# Patient Record
Sex: MALE | Race: BLACK OR AFRICAN AMERICAN | NOT HISPANIC OR LATINO
[De-identification: names, ages, dates, MRNs, and addresses within clinical notes are randomized per-mention and may not be internally consistent; named-entity substitution may affect disease eponyms.]

---

## 2021-05-13 PROBLEM — Z00.00 ENCOUNTER FOR PREVENTIVE HEALTH EXAMINATION: Status: ACTIVE | Noted: 2021-05-13

## 2021-05-14 ENCOUNTER — APPOINTMENT (OUTPATIENT)
Dept: INTERNAL MEDICINE | Facility: CLINIC | Age: 76
End: 2021-05-14
Payer: MEDICARE

## 2021-05-14 ENCOUNTER — TRANSCRIPTION ENCOUNTER (OUTPATIENT)
Age: 76
End: 2021-05-14

## 2021-05-14 VITALS
HEART RATE: 107 BPM | TEMPERATURE: 97.3 F | SYSTOLIC BLOOD PRESSURE: 120 MMHG | DIASTOLIC BLOOD PRESSURE: 64 MMHG | WEIGHT: 165 LBS | HEIGHT: 71 IN | BODY MASS INDEX: 23.1 KG/M2 | OXYGEN SATURATION: 97 %

## 2021-05-14 DIAGNOSIS — Z78.9 OTHER SPECIFIED HEALTH STATUS: ICD-10-CM

## 2021-05-14 PROCEDURE — 93000 ELECTROCARDIOGRAM COMPLETE: CPT

## 2021-05-14 PROCEDURE — 99204 OFFICE O/P NEW MOD 45 MIN: CPT

## 2021-05-14 PROCEDURE — 36415 COLL VENOUS BLD VENIPUNCTURE: CPT

## 2021-05-14 RX ORDER — NIFEDIPINE 60 MG/1
60 TABLET, EXTENDED RELEASE ORAL
Qty: 90 | Refills: 3 | Status: ACTIVE | COMMUNITY
Start: 2021-05-14

## 2021-05-14 RX ORDER — ATORVASTATIN CALCIUM 20 MG/1
20 TABLET, FILM COATED ORAL
Qty: 90 | Refills: 3 | Status: ACTIVE | COMMUNITY
Start: 2021-05-14

## 2021-05-14 NOTE — HISTORY OF PRESENT ILLNESS
[de-identified] : 74 yo M with hx of prostate CA s/p TURP, HTN. HLD.    severe left hip pain with plan for left THR.\par [facility]  \par \par \par colonoscopy - 3 yeas ago.  \par \par \par 8-10 METs\par

## 2021-05-14 NOTE — PLAN
[FreeTextEntry1] : HTN- controlled on current regimen\par HLD- continue statin\par \par Pre operative evaluation - medically optimized but recommend cardiac clearance as well with abnormal EKG.

## 2021-05-16 LAB
ALBUMIN SERPL ELPH-MCNC: 4.7 G/DL
ALP BLD-CCNC: 79 U/L
ALT SERPL-CCNC: 8 U/L
ANION GAP SERPL CALC-SCNC: 12 MMOL/L
APPEARANCE: ABNORMAL
APTT BLD: 35.4 SEC
AST SERPL-CCNC: 16 U/L
BASOPHILS # BLD AUTO: 0.03 K/UL
BASOPHILS NFR BLD AUTO: 0.5 %
BILIRUB SERPL-MCNC: 0.9 MG/DL
BILIRUBIN URINE: NEGATIVE
BLOOD URINE: NEGATIVE
BUN SERPL-MCNC: 12 MG/DL
CALCIUM SERPL-MCNC: 10.4 MG/DL
CHLORIDE SERPL-SCNC: 105 MMOL/L
CO2 SERPL-SCNC: 26 MMOL/L
COLOR: ABNORMAL
CREAT SERPL-MCNC: 1.18 MG/DL
EOSINOPHIL # BLD AUTO: 0.16 K/UL
EOSINOPHIL NFR BLD AUTO: 2.6 %
GLUCOSE QUALITATIVE U: NEGATIVE
GLUCOSE SERPL-MCNC: 100 MG/DL
HCT VFR BLD CALC: 44.8 %
HGB BLD-MCNC: 14.6 G/DL
IMM GRANULOCYTES NFR BLD AUTO: 0.3 %
INR PPP: 0.99 RATIO
KETONES URINE: NORMAL
LEUKOCYTE ESTERASE URINE: NEGATIVE
LYMPHOCYTES # BLD AUTO: 1.28 K/UL
LYMPHOCYTES NFR BLD AUTO: 20.5 %
MAN DIFF?: NORMAL
MCHC RBC-ENTMCNC: 29.4 PG
MCHC RBC-ENTMCNC: 32.6 GM/DL
MCV RBC AUTO: 90.3 FL
MONOCYTES # BLD AUTO: 0.53 K/UL
MONOCYTES NFR BLD AUTO: 8.5 %
NEUTROPHILS # BLD AUTO: 4.22 K/UL
NEUTROPHILS NFR BLD AUTO: 67.6 %
NITRITE URINE: NEGATIVE
PH URINE: 5.5
PLATELET # BLD AUTO: 206 K/UL
POTASSIUM SERPL-SCNC: 3.9 MMOL/L
PROT SERPL-MCNC: 7.9 G/DL
PROTEIN URINE: ABNORMAL
PT BLD: 11.7 SEC
RBC # BLD: 4.96 M/UL
RBC # FLD: 13.9 %
SODIUM SERPL-SCNC: 143 MMOL/L
SPECIFIC GRAVITY URINE: 1.03
UROBILINOGEN URINE: ABNORMAL
WBC # FLD AUTO: 6.24 K/UL

## 2021-05-25 ENCOUNTER — APPOINTMENT (OUTPATIENT)
Dept: HEART AND VASCULAR | Facility: CLINIC | Age: 76
End: 2021-05-25
Payer: MEDICARE

## 2021-05-25 VITALS
TEMPERATURE: 97 F | HEIGHT: 71 IN | HEART RATE: 88 BPM | SYSTOLIC BLOOD PRESSURE: 130 MMHG | BODY MASS INDEX: 23.1 KG/M2 | WEIGHT: 165 LBS | OXYGEN SATURATION: 97 % | DIASTOLIC BLOOD PRESSURE: 80 MMHG

## 2021-05-25 DIAGNOSIS — I10 ESSENTIAL (PRIMARY) HYPERTENSION: ICD-10-CM

## 2021-05-25 DIAGNOSIS — Z01.818 ENCOUNTER FOR OTHER PREPROCEDURAL EXAMINATION: ICD-10-CM

## 2021-05-25 DIAGNOSIS — E78.5 HYPERLIPIDEMIA, UNSPECIFIED: ICD-10-CM

## 2021-05-25 DIAGNOSIS — R94.31 ABNORMAL ELECTROCARDIOGRAM [ECG] [EKG]: ICD-10-CM

## 2021-05-25 PROCEDURE — 99214 OFFICE O/P EST MOD 30 MIN: CPT | Mod: 25

## 2021-05-25 PROCEDURE — 93015 CV STRESS TEST SUPVJ I&R: CPT

## 2021-05-25 NOTE — REASON FOR VISIT
[Arrhythmia/ECG Abnorrmalities] : arrhythmia/ECG abnormalities [Hyperlipidemia] : hyperlipidemia [Hypertension] : hypertension [Other: ____] : [unfilled]

## 2021-05-25 NOTE — DISCUSSION/SUMMARY
[FreeTextEntry1] : stable exam, normal stress test, HTN and LIpids stable on medications, good functional capacity \par no cardiac contra indication to planned procedure

## 2021-05-25 NOTE — HISTORY OF PRESENT ILLNESS
[FreeTextEntry1] : for cardiac evaluation prior to THR\par good functional capacity\par normal stress test \par ECG with LVH by voltage\par HTN controllled\par Lipids on statin

## 2021-05-25 NOTE — PHYSICAL EXAM
[Well Developed] : well developed [Well Nourished] : well nourished [No Acute Distress] : no acute distress [Normal Conjunctiva] : normal conjunctiva [Normal Venous Pressure] : normal venous pressure [No Carotid Bruit] : no carotid bruit [Normal S1, S2] : normal S1, S2 [No Murmur] : no murmur [No Rub] : no rub [No Gallop] : no gallop [Clear Lung Fields] : clear lung fields [Good Air Entry] : good air entry [No Respiratory Distress] : no respiratory distress  [Soft] : abdomen soft [Non Tender] : non-tender [No Masses/organomegaly] : no masses/organomegaly [Normal Bowel Sounds] : normal bowel sounds [Gait - Sufficient for Exercise Testing] : gait - sufficient for exercise testing [No Edema] : no edema [No Cyanosis] : no cyanosis [No Clubbing] : no clubbing [No Rash] : no rash [Moves all extremities] : moves all extremities [No Focal Deficits] : no focal deficits [Normal Speech] : normal speech [Alert and Oriented] : alert and oriented [Normal memory] : normal memory

## 2021-06-08 ENCOUNTER — TRANSCRIPTION ENCOUNTER (OUTPATIENT)
Age: 76
End: 2021-06-08

## 2021-06-08 VITALS
TEMPERATURE: 98 F | OXYGEN SATURATION: 97 % | SYSTOLIC BLOOD PRESSURE: 163 MMHG | HEIGHT: 71 IN | HEART RATE: 93 BPM | RESPIRATION RATE: 17 BRPM | WEIGHT: 234.13 LBS | DIASTOLIC BLOOD PRESSURE: 91 MMHG

## 2021-06-08 RX ORDER — POVIDONE-IODINE 5 %
1 AEROSOL (ML) TOPICAL ONCE
Refills: 0 | Status: COMPLETED | OUTPATIENT
Start: 2021-06-09 | End: 2021-06-09

## 2021-06-08 NOTE — PRE-OP CHECKLIST - LAST TOOK
DISPLAY PLAN FREE TEXT DISPLAY PLAN FREE TEXT DISPLAY PLAN FREE TEXT DISPLAY PLAN FREE TEXT DISPLAY PLAN FREE TEXT DISPLAY PLAN FREE TEXT DISPLAY PLAN FREE TEXT DISPLAY PLAN FREE TEXT DISPLAY PLAN FREE TEXT DISPLAY PLAN FREE TEXT DISPLAY PLAN FREE TEXT DISPLAY PLAN FREE TEXT DISPLAY PLAN FREE TEXT DISPLAY PLAN FREE TEXT DISPLAY PLAN FREE TEXT DISPLAY PLAN FREE TEXT DISPLAY PLAN FREE TEXT DISPLAY PLAN FREE TEXT DISPLAY PLAN FREE TEXT DISPLAY PLAN FREE TEXT DISPLAY PLAN FREE TEXT DISPLAY PLAN FREE TEXT DISPLAY PLAN FREE TEXT solids DISPLAY PLAN FREE TEXT

## 2021-06-08 NOTE — H&P ADULT - HISTORY OF PRESENT ILLNESS
74yo m c/o left hip pain x   Presents today for elective LEFT THR, POSTERIOR APPROACH.  76yo m c/o left hip pain x 1 year. Reports limitations walking 5+ blocks. Pain is located in the hip. Denies numbness or tingling. He has been trying rest, time, occasional oral medication and Bengay.   Presents today for elective LEFT THR, POSTERIOR APPROACH.

## 2021-06-08 NOTE — H&P ADULT - PROBLEM SELECTOR PLAN 1
Admit to Orthopaedic Service.  Presents today for elective  LEFT THR, POSTERIOR APPROACH  Pt medically stable and cleared for procedure today by Dr. Kuhn and Dr. Subramanian.

## 2021-06-08 NOTE — H&P ADULT - NSICDXPASTMEDICALHX_GEN_ALL_CORE_FT
PAST MEDICAL HISTORY:  Arthritis of hip primary left hip    H/O hyperlipidemia     Hypertension, unspecified type

## 2021-06-08 NOTE — H&P ADULT - NSHPLABSRESULTS_GEN_ALL_CORE
preop cbc/bmp/coags/ua wnl per medical clearance   EKG- LVH  Cr 1.18  Preop chest x-ray wnl per clearance   Stress test wnl per medical clearance   [employer] 3/21/21

## 2021-06-08 NOTE — H&P ADULT - NSHPPHYSICALEXAM_GEN_ALL_CORE
GENERAL:  PE:  Decreased ROM secondary to pain. Rest of PE per medical clearance. GENERAL:  Gen: 76 y/o NAD  MSK: Decreased left hip ROM secondary to pain  Bilateral LE without erythema, edema, abrasions/lesion    Rest of PE per MD clearance

## 2021-06-09 ENCOUNTER — INPATIENT (INPATIENT)
Facility: HOSPITAL | Age: 76
LOS: 2 days | Discharge: NURSING, REHAB OR LONG-TERM CARE | DRG: 470 | End: 2021-06-12
Attending: ORTHOPAEDIC SURGERY | Admitting: ORTHOPAEDIC SURGERY
Payer: MEDICARE

## 2021-06-09 DIAGNOSIS — M19.90 UNSPECIFIED OSTEOARTHRITIS, UNSPECIFIED SITE: ICD-10-CM

## 2021-06-09 DIAGNOSIS — Z98.890 OTHER SPECIFIED POSTPROCEDURAL STATES: Chronic | ICD-10-CM

## 2021-06-09 PROCEDURE — 72170 X-RAY EXAM OF PELVIS: CPT | Mod: 26

## 2021-06-09 RX ORDER — ONDANSETRON 8 MG/1
4 TABLET, FILM COATED ORAL EVERY 6 HOURS
Refills: 0 | Status: DISCONTINUED | OUTPATIENT
Start: 2021-06-09 | End: 2021-06-12

## 2021-06-09 RX ORDER — NIFEDIPINE 30 MG
60 TABLET, EXTENDED RELEASE 24 HR ORAL DAILY
Refills: 0 | Status: DISCONTINUED | OUTPATIENT
Start: 2021-06-09 | End: 2021-06-12

## 2021-06-09 RX ORDER — CEFAZOLIN SODIUM 1 G
2000 VIAL (EA) INJECTION EVERY 8 HOURS
Refills: 0 | Status: DISCONTINUED | OUTPATIENT
Start: 2021-06-09 | End: 2021-06-09

## 2021-06-09 RX ORDER — ACETAMINOPHEN 500 MG
975 TABLET ORAL EVERY 8 HOURS
Refills: 0 | Status: DISCONTINUED | OUTPATIENT
Start: 2021-06-09 | End: 2021-06-10

## 2021-06-09 RX ORDER — OXYCODONE HYDROCHLORIDE 5 MG/1
10 TABLET ORAL EVERY 4 HOURS
Refills: 0 | Status: DISCONTINUED | OUTPATIENT
Start: 2021-06-09 | End: 2021-06-12

## 2021-06-09 RX ORDER — HYDROMORPHONE HYDROCHLORIDE 2 MG/ML
0.5 INJECTION INTRAMUSCULAR; INTRAVENOUS; SUBCUTANEOUS
Refills: 0 | Status: DISCONTINUED | OUTPATIENT
Start: 2021-06-09 | End: 2021-06-12

## 2021-06-09 RX ORDER — CEFAZOLIN SODIUM 1 G
2000 VIAL (EA) INJECTION EVERY 8 HOURS
Refills: 0 | Status: COMPLETED | OUTPATIENT
Start: 2021-06-09 | End: 2021-06-09

## 2021-06-09 RX ORDER — CHLORHEXIDINE GLUCONATE 213 G/1000ML
1 SOLUTION TOPICAL ONCE
Refills: 0 | Status: COMPLETED | OUTPATIENT
Start: 2021-06-09 | End: 2021-06-09

## 2021-06-09 RX ORDER — PANTOPRAZOLE SODIUM 20 MG/1
40 TABLET, DELAYED RELEASE ORAL
Refills: 0 | Status: DISCONTINUED | OUTPATIENT
Start: 2021-06-09 | End: 2021-06-12

## 2021-06-09 RX ORDER — CELECOXIB 200 MG/1
200 CAPSULE ORAL
Refills: 0 | Status: DISCONTINUED | OUTPATIENT
Start: 2021-06-09 | End: 2021-06-10

## 2021-06-09 RX ORDER — OXYCODONE HYDROCHLORIDE 5 MG/1
5 TABLET ORAL EVERY 4 HOURS
Refills: 0 | Status: DISCONTINUED | OUTPATIENT
Start: 2021-06-09 | End: 2021-06-12

## 2021-06-09 RX ORDER — HYDROMORPHONE HYDROCHLORIDE 2 MG/ML
0.5 INJECTION INTRAMUSCULAR; INTRAVENOUS; SUBCUTANEOUS EVERY 4 HOURS
Refills: 0 | Status: DISCONTINUED | OUTPATIENT
Start: 2021-06-09 | End: 2021-06-12

## 2021-06-09 RX ORDER — SENNA PLUS 8.6 MG/1
2 TABLET ORAL AT BEDTIME
Refills: 0 | Status: DISCONTINUED | OUTPATIENT
Start: 2021-06-09 | End: 2021-06-12

## 2021-06-09 RX ORDER — PROCHLORPERAZINE MALEATE 5 MG
5 TABLET ORAL ONCE
Refills: 0 | Status: DISCONTINUED | OUTPATIENT
Start: 2021-06-09 | End: 2021-06-12

## 2021-06-09 RX ORDER — POLYETHYLENE GLYCOL 3350 17 G/17G
17 POWDER, FOR SOLUTION ORAL AT BEDTIME
Refills: 0 | Status: DISCONTINUED | OUTPATIENT
Start: 2021-06-09 | End: 2021-06-12

## 2021-06-09 RX ORDER — ATORVASTATIN CALCIUM 80 MG/1
20 TABLET, FILM COATED ORAL AT BEDTIME
Refills: 0 | Status: DISCONTINUED | OUTPATIENT
Start: 2021-06-09 | End: 2021-06-12

## 2021-06-09 RX ORDER — ASPIRIN/CALCIUM CARB/MAGNESIUM 324 MG
325 TABLET ORAL DAILY
Refills: 0 | Status: DISCONTINUED | OUTPATIENT
Start: 2021-06-09 | End: 2021-06-10

## 2021-06-09 RX ORDER — NIFEDIPINE 30 MG
1 TABLET, EXTENDED RELEASE 24 HR ORAL
Qty: 0 | Refills: 0 | DISCHARGE

## 2021-06-09 RX ORDER — ATORVASTATIN CALCIUM 80 MG/1
1 TABLET, FILM COATED ORAL
Qty: 0 | Refills: 0 | DISCHARGE

## 2021-06-09 RX ADMIN — POLYETHYLENE GLYCOL 3350 17 GRAM(S): 17 POWDER, FOR SOLUTION ORAL at 21:32

## 2021-06-09 RX ADMIN — Medication 2000 MILLIGRAM(S): at 16:00

## 2021-06-09 RX ADMIN — ATORVASTATIN CALCIUM 20 MILLIGRAM(S): 80 TABLET, FILM COATED ORAL at 21:32

## 2021-06-09 RX ADMIN — CHLORHEXIDINE GLUCONATE 1 APPLICATION(S): 213 SOLUTION TOPICAL at 05:50

## 2021-06-09 RX ADMIN — CELECOXIB 200 MILLIGRAM(S): 200 CAPSULE ORAL at 18:00

## 2021-06-09 RX ADMIN — SENNA PLUS 2 TABLET(S): 8.6 TABLET ORAL at 21:32

## 2021-06-09 RX ADMIN — Medication 1 TABLET(S): at 16:54

## 2021-06-09 RX ADMIN — OXYCODONE HYDROCHLORIDE 5 MILLIGRAM(S): 5 TABLET ORAL at 21:50

## 2021-06-09 RX ADMIN — Medication 1 APPLICATION(S): at 06:20

## 2021-06-09 RX ADMIN — OXYCODONE HYDROCHLORIDE 5 MILLIGRAM(S): 5 TABLET ORAL at 15:13

## 2021-06-09 RX ADMIN — CELECOXIB 200 MILLIGRAM(S): 200 CAPSULE ORAL at 17:00

## 2021-06-09 RX ADMIN — OXYCODONE HYDROCHLORIDE 5 MILLIGRAM(S): 5 TABLET ORAL at 16:13

## 2021-06-09 RX ADMIN — Medication 325 MILLIGRAM(S): at 16:54

## 2021-06-09 RX ADMIN — Medication 2000 MILLIGRAM(S): at 23:14

## 2021-06-09 RX ADMIN — OXYCODONE HYDROCHLORIDE 5 MILLIGRAM(S): 5 TABLET ORAL at 22:50

## 2021-06-09 NOTE — PROGRESS NOTE ADULT - SUBJECTIVE AND OBJECTIVE BOX
Ortho Post Op Check    Procedure: Left total hip replacement  Surgeon: Dr. Santacruz    Pt comfortable without complaints, pain controlled  Denies CP, SOB, N/V, numbness/tingling     Vital Signs Last 24 Hrs  T(C): 36.4 (06-09-21 @ 12:00), Max: 36.4 (06-09-21 @ 12:00)  T(F): 97.5 (06-09-21 @ 12:00), Max: 97.5 (06-09-21 @ 12:00)  HR: 77 (06-09-21 @ 12:00) (66 - 90)  BP: 141/93 (06-09-21 @ 12:00) (101/56 - 141/93)  BP(mean): 95 (06-09-21 @ 10:58) (71 - 97)  RR: 18 (06-09-21 @ 12:00) (14 - 33)  SpO2: 96% (06-09-21 @ 12:00) (94% - 97%)  I&O's Summary      General:   Pt Alert and oriented, NAD  DSG C/D/I. Aquacel dressing  Pulses: DP2+ distally.   Sensation: General sensation to light touch intact bilateral LE, mildly decreased left  Motor: EHL/FHL/TA/GS 5/5 bilaterally    A/P: 75yMale POD#0 s/p left total hip replacement posterior approach  - Stable  - Pain Control  - DVT ppx: [id number] mg daily  - Post op abx: Ancef  - PT, WBS: WBAT, posterior hip precatuions  - discharge home vs OMI pending PT clearance    Ortho Pager [phone]

## 2021-06-09 NOTE — PHYSICAL THERAPY INITIAL EVALUATION ADULT - CRITERIA FOR SKILLED THERAPEUTIC INTERVENTIONS
impairments found/therapy frequency/predicted duration of therapy intervention/anticipated equipment needs at discharge/anticipated discharge recommendation

## 2021-06-09 NOTE — PHYSICAL THERAPY INITIAL EVALUATION ADULT - LIVES WITH, PROFILE
Pt lives alone in a home with 6 steps to enter and steps within the home./alone Pt lives alone in a home with 6 steps to enter and no steps within the home. Pt sister can check in and friend can drive him for groceries./alone

## 2021-06-09 NOTE — PHYSICAL THERAPY INITIAL EVALUATION ADULT - ASR EQUIP NEEDS DISCH PT EVAL
toileting equipment/rolling walker (5 inch wheels) commode to be ordered/toileting equipment/rolling walker (5 inch wheels)

## 2021-06-09 NOTE — PHYSICAL THERAPY INITIAL EVALUATION ADULT - MARITAL STATUS
I called Sudheer Mayorga and he looked at his Naltrexone bottle. He does have enough medication and is currently taking 1/2 pill (25mg) daily.   
Single

## 2021-06-09 NOTE — PHYSICAL THERAPY INITIAL EVALUATION ADULT - MANUAL MUSCLE TESTING RESULTS, REHAB EVAL
LE MMT >3/5/no strength deficits were identified spinal precaution. DF/PF MMT 5/5/grossly assessed due to

## 2021-06-09 NOTE — BRIEF OPERATIVE NOTE - NSICDXBRIEFPROCEDURE_GEN_ALL_CORE_FT
PROCEDURES:  Hemiarthroplasty of left hip 09-Jun-2021 08:40:33  Tea Conte   PROCEDURES:  Total left hip arthroplasty 09-Jun-2021 08:57:55 Posterior approach Tea Conte

## 2021-06-10 ENCOUNTER — TRANSCRIPTION ENCOUNTER (OUTPATIENT)
Age: 76
End: 2021-06-10

## 2021-06-10 DIAGNOSIS — E78.1 PURE HYPERGLYCERIDEMIA: ICD-10-CM

## 2021-06-10 DIAGNOSIS — B19.20 UNSPECIFIED VIRAL HEPATITIS C WITHOUT HEPATIC COMA: ICD-10-CM

## 2021-06-10 DIAGNOSIS — I10 ESSENTIAL (PRIMARY) HYPERTENSION: ICD-10-CM

## 2021-06-10 DIAGNOSIS — Z98.890 OTHER SPECIFIED POSTPROCEDURAL STATES: ICD-10-CM

## 2021-06-10 LAB
ALBUMIN SERPL ELPH-MCNC: 3.5 G/DL — SIGNIFICANT CHANGE UP (ref 3.3–5)
ALP SERPL-CCNC: 54 U/L — SIGNIFICANT CHANGE UP (ref 40–120)
ALT FLD-CCNC: 9 U/L — LOW (ref 10–45)
ANION GAP SERPL CALC-SCNC: 10 MMOL/L — SIGNIFICANT CHANGE UP (ref 5–17)
AST SERPL-CCNC: 31 U/L — SIGNIFICANT CHANGE UP (ref 10–40)
BILIRUB DIRECT SERPL-MCNC: 0.3 MG/DL — HIGH (ref 0–0.2)
BILIRUB INDIRECT FLD-MCNC: 1.5 MG/DL — HIGH (ref 0.2–1)
BILIRUB SERPL-MCNC: 1.7 MG/DL — HIGH (ref 0.2–1.2)
BUN SERPL-MCNC: 10 MG/DL — SIGNIFICANT CHANGE UP (ref 7–23)
CALCIUM SERPL-MCNC: 9.3 MG/DL — SIGNIFICANT CHANGE UP (ref 8.4–10.5)
CHLORIDE SERPL-SCNC: 101 MMOL/L — SIGNIFICANT CHANGE UP (ref 96–108)
CO2 SERPL-SCNC: 25 MMOL/L — SIGNIFICANT CHANGE UP (ref 22–31)
COVID-19 SPIKE DOMAIN AB INTERP: POSITIVE
COVID-19 SPIKE DOMAIN ANTIBODY RESULT: >250 U/ML — HIGH
CREAT SERPL-MCNC: 0.88 MG/DL — SIGNIFICANT CHANGE UP (ref 0.5–1.3)
GLUCOSE SERPL-MCNC: 106 MG/DL — HIGH (ref 70–99)
HCT VFR BLD CALC: 37.4 % — LOW (ref 39–50)
HCV AB S/CO SERPL IA: 64.22 S/CO — HIGH
HCV AB SERPL-IMP: REACTIVE
HGB BLD-MCNC: 12.4 G/DL — LOW (ref 13–17)
MCHC RBC-ENTMCNC: 28.9 PG — SIGNIFICANT CHANGE UP (ref 27–34)
MCHC RBC-ENTMCNC: 33.2 GM/DL — SIGNIFICANT CHANGE UP (ref 32–36)
MCV RBC AUTO: 87.2 FL — SIGNIFICANT CHANGE UP (ref 80–100)
NRBC # BLD: 0 /100 WBCS — SIGNIFICANT CHANGE UP (ref 0–0)
PLATELET # BLD AUTO: 135 K/UL — LOW (ref 150–400)
POTASSIUM SERPL-MCNC: 3.4 MMOL/L — LOW (ref 3.5–5.3)
POTASSIUM SERPL-SCNC: 3.4 MMOL/L — LOW (ref 3.5–5.3)
PROT SERPL-MCNC: 7.1 G/DL — SIGNIFICANT CHANGE UP (ref 6–8.3)
RBC # BLD: 4.29 M/UL — SIGNIFICANT CHANGE UP (ref 4.2–5.8)
RBC # FLD: 13.4 % — SIGNIFICANT CHANGE UP (ref 10.3–14.5)
SARS-COV-2 IGG+IGM SERPL QL IA: >250 U/ML — HIGH
SARS-COV-2 IGG+IGM SERPL QL IA: POSITIVE
SODIUM SERPL-SCNC: 136 MMOL/L — SIGNIFICANT CHANGE UP (ref 135–145)
WBC # BLD: 10.35 K/UL — SIGNIFICANT CHANGE UP (ref 3.8–10.5)
WBC # FLD AUTO: 10.35 K/UL — SIGNIFICANT CHANGE UP (ref 3.8–10.5)

## 2021-06-10 PROCEDURE — 99232 SBSQ HOSP IP/OBS MODERATE 35: CPT | Mod: GC

## 2021-06-10 RX ORDER — ASPIRIN/CALCIUM CARB/MAGNESIUM 324 MG
325 TABLET ORAL
Refills: 0 | Status: DISCONTINUED | OUTPATIENT
Start: 2021-06-10 | End: 2021-06-12

## 2021-06-10 RX ORDER — KETOROLAC TROMETHAMINE 30 MG/ML
15 SYRINGE (ML) INJECTION EVERY 6 HOURS
Refills: 0 | Status: DISCONTINUED | OUTPATIENT
Start: 2021-06-10 | End: 2021-06-10

## 2021-06-10 RX ORDER — ACETAMINOPHEN 500 MG
650 TABLET ORAL EVERY 6 HOURS
Refills: 0 | Status: DISCONTINUED | OUTPATIENT
Start: 2021-06-10 | End: 2021-06-12

## 2021-06-10 RX ORDER — ACETAMINOPHEN 500 MG
975 TABLET ORAL EVERY 8 HOURS
Refills: 0 | Status: DISCONTINUED | OUTPATIENT
Start: 2021-06-10 | End: 2021-06-10

## 2021-06-10 RX ORDER — CELECOXIB 200 MG/1
200 CAPSULE ORAL
Refills: 0 | Status: DISCONTINUED | OUTPATIENT
Start: 2021-06-11 | End: 2021-06-12

## 2021-06-10 RX ADMIN — PANTOPRAZOLE SODIUM 40 MILLIGRAM(S): 20 TABLET, DELAYED RELEASE ORAL at 05:25

## 2021-06-10 RX ADMIN — Medication 15 MILLIGRAM(S): at 23:40

## 2021-06-10 RX ADMIN — OXYCODONE HYDROCHLORIDE 10 MILLIGRAM(S): 5 TABLET ORAL at 17:15

## 2021-06-10 RX ADMIN — OXYCODONE HYDROCHLORIDE 10 MILLIGRAM(S): 5 TABLET ORAL at 18:15

## 2021-06-10 RX ADMIN — Medication 1 TABLET(S): at 12:05

## 2021-06-10 RX ADMIN — OXYCODONE HYDROCHLORIDE 10 MILLIGRAM(S): 5 TABLET ORAL at 13:05

## 2021-06-10 RX ADMIN — Medication 325 MILLIGRAM(S): at 17:15

## 2021-06-10 RX ADMIN — OXYCODONE HYDROCHLORIDE 10 MILLIGRAM(S): 5 TABLET ORAL at 12:05

## 2021-06-10 RX ADMIN — Medication 325 MILLIGRAM(S): at 08:36

## 2021-06-10 RX ADMIN — Medication 15 MILLIGRAM(S): at 17:30

## 2021-06-10 RX ADMIN — ATORVASTATIN CALCIUM 20 MILLIGRAM(S): 80 TABLET, FILM COATED ORAL at 21:51

## 2021-06-10 RX ADMIN — OXYCODONE HYDROCHLORIDE 10 MILLIGRAM(S): 5 TABLET ORAL at 06:56

## 2021-06-10 RX ADMIN — Medication 15 MILLIGRAM(S): at 12:20

## 2021-06-10 RX ADMIN — Medication 15 MILLIGRAM(S): at 12:05

## 2021-06-10 RX ADMIN — CELECOXIB 200 MILLIGRAM(S): 200 CAPSULE ORAL at 06:25

## 2021-06-10 RX ADMIN — CELECOXIB 200 MILLIGRAM(S): 200 CAPSULE ORAL at 05:25

## 2021-06-10 RX ADMIN — OXYCODONE HYDROCHLORIDE 10 MILLIGRAM(S): 5 TABLET ORAL at 07:56

## 2021-06-10 RX ADMIN — Medication 60 MILLIGRAM(S): at 05:25

## 2021-06-10 RX ADMIN — POLYETHYLENE GLYCOL 3350 17 GRAM(S): 17 POWDER, FOR SOLUTION ORAL at 21:51

## 2021-06-10 RX ADMIN — Medication 15 MILLIGRAM(S): at 23:22

## 2021-06-10 RX ADMIN — SENNA PLUS 2 TABLET(S): 8.6 TABLET ORAL at 21:51

## 2021-06-10 RX ADMIN — Medication 15 MILLIGRAM(S): at 17:15

## 2021-06-10 NOTE — DISCHARGE NOTE PROVIDER - NSDCMRMEDTOKEN_GEN_ALL_CORE_FT
atorvastatin 20 mg oral tablet: 1 tab(s) orally once a day  NIFEdipine 60 mg oral tablet, extended release: 1 tab(s) orally once a day   acetaminophen 325 mg oral tablet: 2 tab(s) orally every 6 hours, As needed, Temp greater or equal to 38C (100.4F), Mild Pain (1 - 3)  aspirin 325 mg oral delayed release tablet: 1 tab(s) orally 2 times a day for 1 month after surgery  atorvastatin 20 mg oral tablet: 1 tab(s) orally once a day  celecoxib 200 mg oral capsule: 1 cap(s) orally 2 times a day  Multiple Vitamins oral tablet: 1 tab(s) orally once a day  NIFEdipine 60 mg oral tablet, extended release: 1 tab(s) orally once a day  oxyCODONE 10 mg oral tablet: 1 tab(s) orally every 4 hours, As needed, Severe Pain (7 - 10)  oxyCODONE 5 mg oral tablet: 1 tab(s) orally every 4 hours, As needed, Moderate Pain (4 - 6)  pantoprazole 40 mg oral delayed release tablet: 1 tab(s) orally once a day (before a meal)  polyethylene glycol 3350 oral powder for reconstitution: 17 gram(s) orally once a day (at bedtime)  senna oral tablet: 2 tab(s) orally once a day (at bedtime)

## 2021-06-10 NOTE — OCCUPATIONAL THERAPY INITIAL EVALUATION ADULT - GENERAL OBSERVATIONS, REHAB EVAL
Pt cleared for OT by YESIKA Menjivar. Pt received semi-rm, NAD, +hip abduction pillow, +bl scd, +L hip bandage c/d/i, +IV heplock.

## 2021-06-10 NOTE — DISCHARGE NOTE PROVIDER - HOSPITAL COURSE
Admitted 6/9/21  Surgery- left total hip replacement posterior approach 6/10/21  reactive to hep C on routine blood work- follow-up outpatient with primary care provider for additional assessment/ follow-up  Leanna-op Antibiotics  Pain control  DVT prophylaxis  OOB/Physical Therapy

## 2021-06-10 NOTE — PROGRESS NOTE ADULT - SUBJECTIVE AND OBJECTIVE BOX
Ortho Note    Pt seen and examined at bedside, comfortable without complaints, pain controlled. Denies CP, SOB, N/V, numbness/tingling     Vital Signs Last 24 Hrs  T(C): 37 (06-10-21 @ 08:36), Max: 37 (06-10-21 @ 08:36)  T(F): 98.6 (06-10-21 @ 08:36), Max: 98.6 (06-10-21 @ 08:36)  HR: 96 (06-10-21 @ 08:36) (96 - 96)  BP: 145/87 (06-10-21 @ 08:36) (140/73 - 145/87)  BP(mean): 95 (06-10-21 @ 07:04) (95 - 95)  RR: 18 (06-10-21 @ 08:36) (18 - 18)  SpO2: 95% (06-10-21 @ 08:36) (95% - 95%)  AVSS    General: Pt Alert and oriented, NAD  L hip DSG C/D/I  Sensation intact s/s/sp/dp/t and symmetric   Motor: EHL/FHL/TA/GS 5/5 and symmetric   2+ DP/PT pulse  Toes WWP        A/P: 75yMale POD#1 s/p L SHERRY posterior approach 6/9   - Stable  - Pain Control  - DVT ppx: ASA  - PT, WBS: WBAT - posterior precautions   - Dispo home pending PT clearance     Ortho Pager [phone]

## 2021-06-10 NOTE — DISCHARGE NOTE PROVIDER - NSDCFUADDINST_GEN_ALL_CORE_FT
Weight bear as tolerated with assistive device. Follow posterior precautions as taught to you by physical and occupational therapy. Do not cross your legs. Sit in high chair. Do not let your knees go above your hips (do not flex your hip more than 90 degrees). Do not bend over to pick things up off of the floor.  No strenuous activity, heavy lifting, driving or returning to work until cleared by MD.  You may shower - dressing is water-resistant, no soaking in bathtubs.  Remove dressing after post op day 5-7, then leave incision open to air. Keep incision clean and dry.  Try to have regular bowel movements, take stool softener or laxative if necessary.  May take Pepcid or Zantac for upset stomach.  May take Aleve or Naproxen instead of Meloxicam/Celebrex.  Swelling may travel all the way down leg to foot, this is normal and will subside in a few weeks.  Call to schedule an appt with Dr. Santacruz for follow up, if you have staples or sutures they will be removed in office.  Contact your doctor if you experience: fever greater than 101.5, chills, chest pain, difficulty breathing, redness or excessive drainage around the incision, other concerns.  Follow up with your primary care provider.   Weight bear as tolerated with assistive device. Follow posterior precautions as taught to you by physical and occupational therapy. Do not cross your legs. Sit in high chair. Do not let your knees go above your hips (do not flex your hip more than 90 degrees). Do not bend over to pick things up off of the floor.  No strenuous activity, heavy lifting, driving or returning to work until cleared by MD.  You may shower - dressing is water-resistant, no soaking in bathtubs.  Remove dressing after post op day 5-7, then leave incision open to air. Keep incision clean and dry.  Take aspirin 325mg twice daily for 1 month after surgery to help prevent blood clots.  Try to have regular bowel movements, take stool softener or laxative if necessary.  May take Pepcid or Zantac for upset stomach.  May take Aleve or Naproxen instead of Meloxicam/Celebrex.  Swelling may travel all the way down leg to foot, this is normal and will subside in a few weeks.  Call to schedule an appt with Dr. Santacruz for follow up, if you have staples or sutures they will be removed in office.  Contact your doctor if you experience: fever greater than 101.5, chills, chest pain, difficulty breathing, redness or excessive drainage around the incision, other concerns.  Follow up with your primary care provider.

## 2021-06-10 NOTE — DISCHARGE NOTE PROVIDER - NSDCCPTREATMENT_GEN_ALL_CORE_FT
PRINCIPAL PROCEDURE  Procedure: Total left hip arthroplasty  Findings and Treatment: Posterior approach

## 2021-06-10 NOTE — PROGRESS NOTE ADULT - SUBJECTIVE AND OBJECTIVE BOX
Ortho Note    Pt seen and examined. Comfortable without complaints, pain controlled  Denies CP, SOB, N/V, numbness/tingling     Vital Signs Last 24 Hrs  T(C): 37 (06-10-21 @ 08:36), Max: 37 (06-10-21 @ 08:36)  T(F): 98.6 (06-10-21 @ 08:36), Max: 98.6 (06-10-21 @ 08:36)  HR: 96 (06-10-21 @ 08:36) (96 - 96)  BP: 145/87 (06-10-21 @ 08:36) (140/73 - 145/87)  BP(mean): 95 (06-10-21 @ 07:04) (95 - 95)  RR: 18 (06-10-21 @ 08:36) (18 - 18)  SpO2: 95% (06-10-21 @ 08:36) (95% - 95%)  AVSS    General: Pt Alert and oriented, NAD  DSG C/D/I  Pulses: +2DP, WWP feet  Sensation: SILT BLE  Motor: 5/5 EHL/FHL/TA/GS                          12.4   10.35 )-----------( 135      ( 10 Eleazar 2021 07:26 )             37.4     06-10    136  |  101  |  10  ----------------------------<  106<H>  3.4<L>   |  25  |  0.88    Ca    9.3      10 Eleazar 2021 07:26        A/P: 75yMale POD#1 s/p left total hip replacement (posterior)  - VSS; afebrile and nontoxic appearing  - Pain Control  - DVT ppx: ASA  - PT, WBS: WBAT, posterior precautions; OT consult  - OOB for meals as tolerated, I/S  - bowel regimen  - dispo: home with HPT pending PT clearance    Ortho Pager [phone] Ortho Note    Pt seen and examined. Comfortable without complaints, pain controlled  Denies CP, SOB, N/V, numbness/tingling. Pt incidentally hep C reactive on post-op routine labs.     Vital Signs Last 24 Hrs  T(C): 37 (06-10-21 @ 08:36), Max: 37 (06-10-21 @ 08:36)  T(F): 98.6 (06-10-21 @ 08:36), Max: 98.6 (06-10-21 @ 08:36)  HR: 96 (06-10-21 @ 08:36) (96 - 96)  BP: 145/87 (06-10-21 @ 08:36) (140/73 - 145/87)  BP(mean): 95 (06-10-21 @ 07:04) (95 - 95)  RR: 18 (06-10-21 @ 08:36) (18 - 18)  SpO2: 95% (06-10-21 @ 08:36) (95% - 95%)  AVSS    General: Pt Alert and oriented, NAD  DSG C/D/I  Pulses: +2DP, WWP feet  Sensation: SILT BLE  Motor: 5/5 EHL/FHL/TA/GS                          12.4   10.35 )-----------( 135      ( 10 Eleazar 2021 07:26 )             37.4     06-10    136  |  101  |  10  ----------------------------<  106<H>  3.4<L>   |  25  |  0.88    Ca    9.3      10 Eleazar 2021 07:26        A/P: 75yMale POD#1 s/p left total hip replacement (posterior)  - VSS; afebrile and nontoxic appearing  - Pain Control  - DVT ppx: ASA  - PT, WBS: WBAT, posterior precautions; OT consult  - OOB for meals as tolerated, I/S  - bowel regimen  - assess liver fx with possible hep C (reactive on labs); for outpatient follow-up; appreciate medicine Dr. Familia avendaño  - dispo: home with HPT pending PT clearance    Ortho Pager [phone]

## 2021-06-10 NOTE — DISCHARGE NOTE PROVIDER - NSDCHHNEEDSERVICE_GEN_ALL_CORE
Medication teaching and assessment/Observation and assessment/Rehabilitation services/Wound care and assessment

## 2021-06-10 NOTE — DISCHARGE NOTE PROVIDER - NSDCCPCAREPLAN_GEN_ALL_CORE_FT
PRINCIPAL DISCHARGE DIAGNOSIS  Diagnosis: Osteoarthritis  Assessment and Plan of Treatment: improvement s/p L THR

## 2021-06-10 NOTE — DISCHARGE NOTE PROVIDER - CARE PROVIDER_API CALL
Shane Santacruz)  Orthopaedic Surgery  [address] [place] [address]  Phone: [phone]  Fax: [phone]  Follow Up Time: 2 weeks

## 2021-06-10 NOTE — PROGRESS NOTE ADULT - SUBJECTIVE AND OBJECTIVE BOX
Interval Events: Reviewed  Patient seen and examined at bedside.    Patient is a 75y old  Male who presents with a chief complaint of left hip pain (10 Eleazar 2021 11:29)    he is doing better  PAST MEDICAL & SURGICAL HISTORY:  Arthritis of hip  primary left hip    Hypertension, unspecified type    H/O hyperlipidemia    History of transurethral prostatectomy        MEDICATIONS:  Pulmonary:    Antimicrobials:    Anticoagulants:  aspirin enteric coated 325 milliGRAM(s) Oral two times a day    Cardiac:  NIFEdipine XL 60 milliGRAM(s) Oral daily      Allergies    No Known Allergies    Intolerances        Vital Signs Last 24 Hrs  T(C): 36.4 (10 Eleazar 2021 20:34), Max: 37 (10 Eleazar 2021 08:36)  T(F): 97.6 (10 Eleazar 2021 20:34), Max: 98.6 (10 Eleazar 2021 08:36)  HR: 101 (10 Eleazar 2021 20:34) (81 - 107)  BP: 137/77 (10 Eleazar 2021 20:34) (111/73 - 172/82)  BP(mean): 95 (10 Eleazar 2021 07:04) (95 - 118)  RR: 16 (10 Eleazar 2021 20:34) (15 - 18)  SpO2: 98% (10 Eleazar 2021 20:34) (95% - 98%)    06-09 @ 07:01  -  06-10 @ 07:00  --------------------------------------------------------  IN: 1320 mL / OUT: 700 mL / NET: 620 mL    06-10 @ 07:01  -  06-10 @ 22:37  --------------------------------------------------------  IN: 0 mL / OUT: 300 mL / NET: -300 mL          Review of Systems:   •	General: negative  •	Skin/Breast: negative  •	Ophthalmologic: negative  •	ENMT: negative  •	Respiratory and Thorax: negative  •	Cardiovascular: negative  •	Gastrointestinal: negative  •	Genitourinary: negative  •	Musculoskeletal: negative  •	Neurological: negative  •	Psychiatric: negative  •	Hematology/Lymphatics: negative  •	Endocrine: negative  •	Allergic/Immunologic: negative    Physical Exam:   • Constitutional:	refer to the dietion /Nutritionist note  • Eyes:	EOMI; PERRL; no drainage or redness  • ENMT:	No oral lesions; no gross abnormalities  • Neck	No bruits; no thyromegaly or nodules  • Breasts:	not examined  • Back:	No deformity or limitation of movement  • Respiratory:	Breath Sounds equal & clear to percussion & auscultation, no accessory muscle use  • Cardiovascular:	Regular rate & rhythm, normal S1, S2; no murmurs, gallops or rubs; no S3, S4  • Gastrointestinal:	Soft, non-tender, no hepatosplenomegaly, normal bowel sounds  • Genitourinary:	not examined  • Rectal: not examined  • Extremities:	No cyanosis, clubbing or edema  • Vascular:	Equal and normal pulses (carotid, femoral, dorsalis pedis)  • Neurologica:l	not examined  • Skin:	No lesions; no rash  • Lymph Nodes:	No lymphadedenopathy  • Musculoskeletal:	No joint pain, swelling or deformity; no limitation of movement        LABS:      CBC Full  -  ( 10 Eleazar 2021 07:26 )  WBC Count : 10.35 K/uL  RBC Count : 4.29 M/uL  Hemoglobin : 12.4 g/dL  Hematocrit : 37.4 %  Platelet Count - Automated : 135 K/uL  Mean Cell Volume : 87.2 fl  Mean Cell Hemoglobin : 28.9 pg  Mean Cell Hemoglobin Concentration : 33.2 gm/dL  Auto Neutrophil # : x  Auto Lymphocyte # : x  Auto Monocyte # : x  Auto Eosinophil # : x  Auto Basophil # : x  Auto Neutrophil % : x  Auto Lymphocyte % : x  Auto Monocyte % : x  Auto Eosinophil % : x  Auto Basophil % : x    06-10    136  |  101  |  10  ----------------------------<  106<H>  3.4<L>   |  25  |  0.88    Ca    9.3      10 Eleazar 2021 07:26    TPro  7.1  /  Alb  3.5  /  TBili  1.7<H>  /  DBili  0.3<H>  /  AST  31  /  ALT  9<L>  /  AlkPhos  54  06-10                        RADIOLOGY & ADDITIONAL STUDIES (The following images were personally reviewed):

## 2021-06-11 LAB
ALBUMIN SERPL ELPH-MCNC: 3.1 G/DL — LOW (ref 3.3–5)
ALP SERPL-CCNC: 57 U/L — SIGNIFICANT CHANGE UP (ref 40–120)
ALT FLD-CCNC: 9 U/L — LOW (ref 10–45)
ANION GAP SERPL CALC-SCNC: 11 MMOL/L — SIGNIFICANT CHANGE UP (ref 5–17)
AST SERPL-CCNC: 31 U/L — SIGNIFICANT CHANGE UP (ref 10–40)
BILIRUB SERPL-MCNC: 2 MG/DL — HIGH (ref 0.2–1.2)
BUN SERPL-MCNC: 15 MG/DL — SIGNIFICANT CHANGE UP (ref 7–23)
CALCIUM SERPL-MCNC: 9.6 MG/DL — SIGNIFICANT CHANGE UP (ref 8.4–10.5)
CHLORIDE SERPL-SCNC: 101 MMOL/L — SIGNIFICANT CHANGE UP (ref 96–108)
CO2 SERPL-SCNC: 21 MMOL/L — LOW (ref 22–31)
CREAT SERPL-MCNC: 0.99 MG/DL — SIGNIFICANT CHANGE UP (ref 0.5–1.3)
GLUCOSE SERPL-MCNC: 109 MG/DL — HIGH (ref 70–99)
HCT VFR BLD CALC: 34.8 % — LOW (ref 39–50)
HGB BLD-MCNC: 11.8 G/DL — LOW (ref 13–17)
MCHC RBC-ENTMCNC: 29.4 PG — SIGNIFICANT CHANGE UP (ref 27–34)
MCHC RBC-ENTMCNC: 33.9 GM/DL — SIGNIFICANT CHANGE UP (ref 32–36)
MCV RBC AUTO: 86.8 FL — SIGNIFICANT CHANGE UP (ref 80–100)
NRBC # BLD: 0 /100 WBCS — SIGNIFICANT CHANGE UP (ref 0–0)
PLATELET # BLD AUTO: 127 K/UL — LOW (ref 150–400)
POTASSIUM SERPL-MCNC: 4.3 MMOL/L — SIGNIFICANT CHANGE UP (ref 3.5–5.3)
POTASSIUM SERPL-SCNC: 4.3 MMOL/L — SIGNIFICANT CHANGE UP (ref 3.5–5.3)
PROT SERPL-MCNC: 6.9 G/DL — SIGNIFICANT CHANGE UP (ref 6–8.3)
RBC # BLD: 4.01 M/UL — LOW (ref 4.2–5.8)
RBC # FLD: 13.3 % — SIGNIFICANT CHANGE UP (ref 10.3–14.5)
SARS-COV-2 RNA SPEC QL NAA+PROBE: NEGATIVE — SIGNIFICANT CHANGE UP
SODIUM SERPL-SCNC: 133 MMOL/L — LOW (ref 135–145)
WBC # BLD: 11.52 K/UL — HIGH (ref 3.8–10.5)
WBC # FLD AUTO: 11.52 K/UL — HIGH (ref 3.8–10.5)

## 2021-06-11 PROCEDURE — 99232 SBSQ HOSP IP/OBS MODERATE 35: CPT | Mod: GC

## 2021-06-11 RX ORDER — OXYCODONE HYDROCHLORIDE 5 MG/1
1 TABLET ORAL
Qty: 0 | Refills: 0 | DISCHARGE
Start: 2021-06-11

## 2021-06-11 RX ORDER — SENNA PLUS 8.6 MG/1
2 TABLET ORAL
Qty: 0 | Refills: 0 | DISCHARGE
Start: 2021-06-11

## 2021-06-11 RX ORDER — ACETAMINOPHEN 500 MG
2 TABLET ORAL
Qty: 0 | Refills: 0 | DISCHARGE
Start: 2021-06-11

## 2021-06-11 RX ORDER — PANTOPRAZOLE SODIUM 20 MG/1
1 TABLET, DELAYED RELEASE ORAL
Qty: 0 | Refills: 0 | DISCHARGE
Start: 2021-06-11

## 2021-06-11 RX ORDER — POLYETHYLENE GLYCOL 3350 17 G/17G
17 POWDER, FOR SOLUTION ORAL
Qty: 0 | Refills: 0 | DISCHARGE
Start: 2021-06-11

## 2021-06-11 RX ORDER — CELECOXIB 200 MG/1
1 CAPSULE ORAL
Qty: 0 | Refills: 0 | DISCHARGE
Start: 2021-06-11

## 2021-06-11 RX ORDER — ASPIRIN/CALCIUM CARB/MAGNESIUM 324 MG
1 TABLET ORAL
Qty: 0 | Refills: 0 | DISCHARGE
Start: 2021-06-11

## 2021-06-11 RX ORDER — MAGNESIUM HYDROXIDE 400 MG/1
30 TABLET, CHEWABLE ORAL DAILY
Refills: 0 | Status: DISCONTINUED | OUTPATIENT
Start: 2021-06-11 | End: 2021-06-12

## 2021-06-11 RX ADMIN — CELECOXIB 200 MILLIGRAM(S): 200 CAPSULE ORAL at 05:13

## 2021-06-11 RX ADMIN — CELECOXIB 200 MILLIGRAM(S): 200 CAPSULE ORAL at 19:23

## 2021-06-11 RX ADMIN — ATORVASTATIN CALCIUM 20 MILLIGRAM(S): 80 TABLET, FILM COATED ORAL at 21:12

## 2021-06-11 RX ADMIN — CELECOXIB 200 MILLIGRAM(S): 200 CAPSULE ORAL at 18:23

## 2021-06-11 RX ADMIN — SENNA PLUS 2 TABLET(S): 8.6 TABLET ORAL at 21:12

## 2021-06-11 RX ADMIN — OXYCODONE HYDROCHLORIDE 10 MILLIGRAM(S): 5 TABLET ORAL at 23:38

## 2021-06-11 RX ADMIN — Medication 60 MILLIGRAM(S): at 05:13

## 2021-06-11 RX ADMIN — Medication 1 TABLET(S): at 12:53

## 2021-06-11 RX ADMIN — OXYCODONE HYDROCHLORIDE 10 MILLIGRAM(S): 5 TABLET ORAL at 06:14

## 2021-06-11 RX ADMIN — OXYCODONE HYDROCHLORIDE 10 MILLIGRAM(S): 5 TABLET ORAL at 13:53

## 2021-06-11 RX ADMIN — Medication 325 MILLIGRAM(S): at 18:24

## 2021-06-11 RX ADMIN — PANTOPRAZOLE SODIUM 40 MILLIGRAM(S): 20 TABLET, DELAYED RELEASE ORAL at 05:13

## 2021-06-11 RX ADMIN — Medication 325 MILLIGRAM(S): at 05:13

## 2021-06-11 RX ADMIN — CELECOXIB 200 MILLIGRAM(S): 200 CAPSULE ORAL at 06:14

## 2021-06-11 RX ADMIN — OXYCODONE HYDROCHLORIDE 10 MILLIGRAM(S): 5 TABLET ORAL at 12:53

## 2021-06-11 RX ADMIN — POLYETHYLENE GLYCOL 3350 17 GRAM(S): 17 POWDER, FOR SOLUTION ORAL at 21:12

## 2021-06-11 RX ADMIN — OXYCODONE HYDROCHLORIDE 10 MILLIGRAM(S): 5 TABLET ORAL at 05:14

## 2021-06-11 NOTE — PROGRESS NOTE ADULT - SUBJECTIVE AND OBJECTIVE BOX
Ortho Note    Pt comfortable without complaints, pain controlled  Denies CP, SOB, N/V, numbness/tingling     Vital Signs Last 24 Hrs  T(C): 36.5 (06-11-21 @ 08:44), Max: 37.6 (06-11-21 @ 04:55)  T(F): 97.7 (06-11-21 @ 08:44), Max: 99.6 (06-11-21 @ 04:55)  HR: 90 (06-11-21 @ 08:44) (90 - 99)  BP: 118/73 (06-11-21 @ 08:44) (118/73 - 151/83)  BP(mean): --  RR: 18 (06-11-21 @ 08:44) (15 - 18)  SpO2: 96% (06-11-21 @ 08:44) (96% - 97%)  AVSS    General: Pt Alert and oriented, NAD  DSG C/D/I  Pulses: +2DP, WWP feet  Sensation: SILT BLE  Motor: 5/5 EHL/FHL/TA/GS                          11.8   11.52 )-----------( 127      ( 11 Jun 2021 04:49 )             34.8     06-11    133<L>  |  101  |  15  ----------------------------<  109<H>  4.3   |  21<L>  |  0.99    Ca    9.6      11 Jun 2021 04:49    TPro  6.9  /  Alb  3.1<L>  /  TBili  2.0<H>  /  DBili  x   /  AST  31  /  ALT  9<L>  /  AlkPhos  57  06-11      A/P: 75yMale POD#2 s/p left total hip replacement (posterior)  - hep C reactive- plan for outpatient f/u  - Pain Control  - DVT ppx: ASA  - PT, WBS: WBAT, posterior precautions  - OOB for meals, I/S  - dispo: OMI, pending auth    Ortho Pager [phone]

## 2021-06-11 NOTE — PROGRESS NOTE ADULT - SUBJECTIVE AND OBJECTIVE BOX
Ortho Note    Pt comfortable without complaints, pain controlled  Denies CP, SOB, N/V, numbness/tingling     Vital Signs Last 24 Hrs  T(C): 36.5 (06-11-21 @ 08:44), Max: 37.6 (06-11-21 @ 04:55)  T(F): 97.7 (06-11-21 @ 08:44), Max: 99.6 (06-11-21 @ 04:55)  HR: 90 (06-11-21 @ 08:44) (90 - 99)  BP: 118/73 (06-11-21 @ 08:44) (118/73 - 151/83)  BP(mean): --  RR: 18 (06-11-21 @ 08:44) (15 - 18)  SpO2: 96% (06-11-21 @ 08:44) (96% - 97%)  AVSS    General: Pt Alert and oriented, NAD  L hip DSG C/D/I  Sensation intact s/s/sp/dp/t and symmetric   Motor: EHL/FHL/TA/GS 5/5 and symmetric   2+ DP/PT pulse  Toes WWP        A/P: 75yMale s/p L SHERRY posterior approach 6/9   - Stable  - Pain Control  - DVT ppx: ASA  - PT, WBS: WBAT - posterior precautions   - Dispo home pending PT clearance     Ortho Pager [phone]

## 2021-06-11 NOTE — PROGRESS NOTE ADULT - SUBJECTIVE AND OBJECTIVE BOX
Interval Events: Reviewed  Patient seen and examined at bedside.    Patient is a 75y old  Male who presents with a chief complaint of left hip pain (11 Jun 2021 10:33)    he is doing well  PAST MEDICAL & SURGICAL HISTORY:  Arthritis of hip  primary left hip    Hypertension, unspecified type    H/O hyperlipidemia    History of transurethral prostatectomy        MEDICATIONS:  Pulmonary:    Antimicrobials:    Anticoagulants:  aspirin enteric coated 325 milliGRAM(s) Oral two times a day    Cardiac:  NIFEdipine XL 60 milliGRAM(s) Oral daily      Allergies    No Known Allergies    Intolerances        Vital Signs Last 24 Hrs  T(C): 37 (11 Jun 2021 15:45), Max: 37.6 (11 Jun 2021 04:55)  T(F): 98.6 (11 Jun 2021 15:45), Max: 99.6 (11 Jun 2021 04:55)  HR: 105 (11 Jun 2021 15:45) (90 - 125)  BP: 147/74 (11 Jun 2021 15:45) (118/73 - 154/75)  BP(mean): --  RR: 17 (11 Jun 2021 15:45) (15 - 18)  SpO2: 98% (11 Jun 2021 15:45) (96% - 98%)    06-10 @ 07:01  -  06-11 @ 07:00  --------------------------------------------------------  IN: 480 mL / OUT: 1150 mL / NET: -670 mL          Review of Systems:   •	General: negative  •	Skin/Breast: negative  •	Ophthalmologic: negative  •	ENMT: negative  •	Respiratory and Thorax: negative  •	Cardiovascular: negative  •	Gastrointestinal: negative  •	Genitourinary: negative  •	Musculoskeletal: negative  •	Neurological: negative  •	Psychiatric: negative  •	Hematology/Lymphatics: negative  •	Endocrine: negative  •	Allergic/Immunologic: negative    Physical Exam:   • Constitutional:	refer to the dietion /Nutritionist note  • Eyes:	EOMI; PERRL; no drainage or redness  • ENMT:	No oral lesions; no gross abnormalities  • Neck	No bruits; no thyromegaly or nodules  • Breasts:	not examined  • Back:	No deformity or limitation of movement  • Respiratory:	Breath Sounds equal & clear to percussion & auscultation, no accessory muscle use  • Cardiovascular:	Regular rate & rhythm, normal S1, S2; no murmurs, gallops or rubs; no S3, S4  • Gastrointestinal:	Soft, non-tender, no hepatosplenomegaly, normal bowel sounds  • Genitourinary:	not examined  • Rectal: not examined  • Extremities:	No cyanosis, clubbing or edema  • Vascular:	Equal and normal pulses (carotid, femoral, dorsalis pedis)  • Neurologica:l	not examined  • Skin:	No lesions; no rash  • Lymph Nodes:	No lymphadedenopathy  • Musculoskeletal:	No joint pain, swelling or deformity; no limitation of movement        LABS:      CBC Full  -  ( 11 Jun 2021 04:49 )  WBC Count : 11.52 K/uL  RBC Count : 4.01 M/uL  Hemoglobin : 11.8 g/dL  Hematocrit : 34.8 %  Platelet Count - Automated : 127 K/uL  Mean Cell Volume : 86.8 fl  Mean Cell Hemoglobin : 29.4 pg  Mean Cell Hemoglobin Concentration : 33.9 gm/dL  Auto Neutrophil # : x  Auto Lymphocyte # : x  Auto Monocyte # : x  Auto Eosinophil # : x  Auto Basophil # : x  Auto Neutrophil % : x  Auto Lymphocyte % : x  Auto Monocyte % : x  Auto Eosinophil % : x  Auto Basophil % : x    06-11    133<L>  |  101  |  15  ----------------------------<  109<H>  4.3   |  21<L>  |  0.99    Ca    9.6      11 Jun 2021 04:49    TPro  6.9  /  Alb  3.1<L>  /  TBili  2.0<H>  /  DBili  x   /  AST  31  /  ALT  9<L>  /  AlkPhos  57  06-11                        RADIOLOGY & ADDITIONAL STUDIES (The following images were personally reviewed):

## 2021-06-12 ENCOUNTER — TRANSCRIPTION ENCOUNTER (OUTPATIENT)
Age: 76
End: 2021-06-12

## 2021-06-12 VITALS
TEMPERATURE: 98 F | HEART RATE: 104 BPM | SYSTOLIC BLOOD PRESSURE: 146 MMHG | RESPIRATION RATE: 15 BRPM | DIASTOLIC BLOOD PRESSURE: 75 MMHG | OXYGEN SATURATION: 94 %

## 2021-06-12 LAB
ANION GAP SERPL CALC-SCNC: 11 MMOL/L — SIGNIFICANT CHANGE UP (ref 5–17)
BUN SERPL-MCNC: 19 MG/DL — SIGNIFICANT CHANGE UP (ref 7–23)
CALCIUM SERPL-MCNC: 9.5 MG/DL — SIGNIFICANT CHANGE UP (ref 8.4–10.5)
CHLORIDE SERPL-SCNC: 103 MMOL/L — SIGNIFICANT CHANGE UP (ref 96–108)
CO2 SERPL-SCNC: 24 MMOL/L — SIGNIFICANT CHANGE UP (ref 22–31)
CREAT SERPL-MCNC: 1.03 MG/DL — SIGNIFICANT CHANGE UP (ref 0.5–1.3)
GLUCOSE SERPL-MCNC: 82 MG/DL — SIGNIFICANT CHANGE UP (ref 70–99)
HCT VFR BLD CALC: 35.3 % — LOW (ref 39–50)
HCV RNA FLD QL NAA+PROBE: SIGNIFICANT CHANGE UP
HGB BLD-MCNC: 11.6 G/DL — LOW (ref 13–17)
MCHC RBC-ENTMCNC: 29 PG — SIGNIFICANT CHANGE UP (ref 27–34)
MCHC RBC-ENTMCNC: 32.9 GM/DL — SIGNIFICANT CHANGE UP (ref 32–36)
MCV RBC AUTO: 88.3 FL — SIGNIFICANT CHANGE UP (ref 80–100)
NRBC # BLD: 0 /100 WBCS — SIGNIFICANT CHANGE UP (ref 0–0)
PLATELET # BLD AUTO: 137 K/UL — LOW (ref 150–400)
POTASSIUM SERPL-MCNC: 3.2 MMOL/L — LOW (ref 3.5–5.3)
POTASSIUM SERPL-SCNC: 3.2 MMOL/L — LOW (ref 3.5–5.3)
RBC # BLD: 4 M/UL — LOW (ref 4.2–5.8)
RBC # FLD: 13.6 % — SIGNIFICANT CHANGE UP (ref 10.3–14.5)
SODIUM SERPL-SCNC: 138 MMOL/L — SIGNIFICANT CHANGE UP (ref 135–145)
WBC # BLD: 9.38 K/UL — SIGNIFICANT CHANGE UP (ref 3.8–10.5)
WBC # FLD AUTO: 9.38 K/UL — SIGNIFICANT CHANGE UP (ref 3.8–10.5)

## 2021-06-12 PROCEDURE — C1776: CPT

## 2021-06-12 PROCEDURE — 87521 HEPATITIS C PROBE&RVRS TRNSC: CPT

## 2021-06-12 PROCEDURE — 86900 BLOOD TYPING SEROLOGIC ABO: CPT

## 2021-06-12 PROCEDURE — 86803 HEPATITIS C AB TEST: CPT

## 2021-06-12 PROCEDURE — 97535 SELF CARE MNGMENT TRAINING: CPT

## 2021-06-12 PROCEDURE — 85027 COMPLETE CBC AUTOMATED: CPT

## 2021-06-12 PROCEDURE — 87635 SARS-COV-2 COVID-19 AMP PRB: CPT

## 2021-06-12 PROCEDURE — 99232 SBSQ HOSP IP/OBS MODERATE 35: CPT

## 2021-06-12 PROCEDURE — 80053 COMPREHEN METABOLIC PANEL: CPT

## 2021-06-12 PROCEDURE — 72170 X-RAY EXAM OF PELVIS: CPT

## 2021-06-12 PROCEDURE — 86769 SARS-COV-2 COVID-19 ANTIBODY: CPT

## 2021-06-12 PROCEDURE — 97161 PT EVAL LOW COMPLEX 20 MIN: CPT

## 2021-06-12 PROCEDURE — 86850 RBC ANTIBODY SCREEN: CPT

## 2021-06-12 PROCEDURE — C1713: CPT

## 2021-06-12 PROCEDURE — 36415 COLL VENOUS BLD VENIPUNCTURE: CPT

## 2021-06-12 PROCEDURE — 97116 GAIT TRAINING THERAPY: CPT

## 2021-06-12 PROCEDURE — 80048 BASIC METABOLIC PNL TOTAL CA: CPT

## 2021-06-12 PROCEDURE — 80076 HEPATIC FUNCTION PANEL: CPT

## 2021-06-12 PROCEDURE — 86901 BLOOD TYPING SEROLOGIC RH(D): CPT

## 2021-06-12 RX ADMIN — CELECOXIB 200 MILLIGRAM(S): 200 CAPSULE ORAL at 17:38

## 2021-06-12 RX ADMIN — Medication 60 MILLIGRAM(S): at 05:48

## 2021-06-12 RX ADMIN — CELECOXIB 200 MILLIGRAM(S): 200 CAPSULE ORAL at 16:34

## 2021-06-12 RX ADMIN — OXYCODONE HYDROCHLORIDE 10 MILLIGRAM(S): 5 TABLET ORAL at 17:47

## 2021-06-12 RX ADMIN — OXYCODONE HYDROCHLORIDE 5 MILLIGRAM(S): 5 TABLET ORAL at 11:30

## 2021-06-12 RX ADMIN — PANTOPRAZOLE SODIUM 40 MILLIGRAM(S): 20 TABLET, DELAYED RELEASE ORAL at 05:47

## 2021-06-12 RX ADMIN — OXYCODONE HYDROCHLORIDE 5 MILLIGRAM(S): 5 TABLET ORAL at 12:00

## 2021-06-12 RX ADMIN — CELECOXIB 200 MILLIGRAM(S): 200 CAPSULE ORAL at 05:47

## 2021-06-12 RX ADMIN — Medication 325 MILLIGRAM(S): at 05:48

## 2021-06-12 RX ADMIN — Medication 1 TABLET(S): at 11:31

## 2021-06-12 RX ADMIN — Medication 325 MILLIGRAM(S): at 16:34

## 2021-06-12 RX ADMIN — OXYCODONE HYDROCHLORIDE 10 MILLIGRAM(S): 5 TABLET ORAL at 18:30

## 2021-06-12 RX ADMIN — CELECOXIB 200 MILLIGRAM(S): 200 CAPSULE ORAL at 06:47

## 2021-06-12 RX ADMIN — OXYCODONE HYDROCHLORIDE 10 MILLIGRAM(S): 5 TABLET ORAL at 00:38

## 2021-06-12 NOTE — PROGRESS NOTE ADULT - PROBLEM SELECTOR PLAN 1
Blood pressures controlled continue on lisinopril.

## 2021-06-12 NOTE — PROGRESS NOTE ADULT - SUBJECTIVE AND OBJECTIVE BOX
Interval Events: Reviewed  Patient seen and examined at bedside.    Patient is a 75y old  Male who presents with a chief complaint of left hip pain (12 Jun 2021 05:53)  no acute event overnight, pain controlled      PAST MEDICAL & SURGICAL HISTORY:  Arthritis of hip  primary left hip    Hypertension, unspecified type    H/O hyperlipidemia    History of transurethral prostatectomy        MEDICATIONS:  Pulmonary:    Antimicrobials:    Anticoagulants:  aspirin enteric coated 325 milliGRAM(s) Oral two times a day    Cardiac:  NIFEdipine XL 60 milliGRAM(s) Oral daily      Allergies    No Known Allergies    Intolerances        Vital Signs Last 24 Hrs  T(C): 36.8 (12 Jun 2021 05:50), Max: 37.8 (11 Jun 2021 20:29)  T(F): 98.2 (12 Jun 2021 05:50), Max: 100.1 (11 Jun 2021 20:29)  HR: 97 (12 Jun 2021 05:50) (90 - 125)  BP: 142/82 (12 Jun 2021 05:50) (118/73 - 162/80)  BP(mean): --  RR: 16 (12 Jun 2021 05:50) (16 - 18)  SpO2: 97% (12 Jun 2021 05:50) (95% - 98%)    06-11 @ 07:01  -  06-12 @ 07:00  --------------------------------------------------------  IN: 0 mL / OUT: 200 mL / NET: -200 mL          Review of Systems:   •	General: negative  •	Skin/Breast: negative  •	Ophthalmologic: negative  •	ENMT: negative  •	Respiratory and Thorax: negative  •	Cardiovascular: negative  •	Gastrointestinal: negative  •	Genitourinary: negative  •	Musculoskeletal: negative  •	Neurological: negative  •	Psychiatric: negative  •	Hematology/Lymphatics: negative  •	Endocrine: negative  •	Allergic/Immunologic: negative    Physical Exam:   • Constitutional:	Well-developed, well nourished  • Eyes:	EOMI; PERRL; no drainage or redness  • ENMT:	No oral lesions; no gross abnormalities  • Neck	no thyromegaly or nodules  • Breasts:	not examined  • Back:	No deformity or limitation of movement  • Respiratory:	Breath Sounds equal & clear to auscultation, no accessory muscle use  • Cardiovascular:	Regular rate & rhythm, normal S1, S2; no murmurs, gallops or rubs; no S3, S4  • Gastrointestinal:	Soft, non-tender, no hepatosplenomegaly, normal bowel sounds  • Genitourinary:	not examined  • Rectal: not examined  • Extremities:	No cyanosis, clubbing or edema, left hip dressing intact   • Vascular:	Equal and normal pulses (dorsalis pedis)  • Neurologica:l	not examined  • Skin:	No lesions; no rash  • Lymph Nodes:	No lymphadedenopathy  • Musculoskeletal:	No joint pain, swelling or deformity; no limitation of movement        LABS:      CBC Full  -  ( 11 Jun 2021 04:49 )  WBC Count : 11.52 K/uL  RBC Count : 4.01 M/uL  Hemoglobin : 11.8 g/dL  Hematocrit : 34.8 %  Platelet Count - Automated : 127 K/uL  Mean Cell Volume : 86.8 fl  Mean Cell Hemoglobin : 29.4 pg  Mean Cell Hemoglobin Concentration : 33.9 gm/dL  Auto Neutrophil # : x  Auto Lymphocyte # : x  Auto Monocyte # : x  Auto Eosinophil # : x  Auto Basophil # : x  Auto Neutrophil % : x  Auto Lymphocyte % : x  Auto Monocyte % : x  Auto Eosinophil % : x  Auto Basophil % : x    06-11    133<L>  |  101  |  15  ----------------------------<  109<H>  4.3   |  21<L>  |  0.99    Ca    9.6      11 Jun 2021 04:49    TPro  6.9  /  Alb  3.1<L>  /  TBili  2.0<H>  /  DBili  x   /  AST  31  /  ALT  9<L>  /  AlkPhos  57  06-11                        RADIOLOGY & ADDITIONAL STUDIES (The following images were personally reviewed):  Burnham:                                     No  Urine output:                       adequate  DVT prophylaxis:                 Yes  Flattus:                                  Yes  Bowel movement:              No

## 2021-06-12 NOTE — PROGRESS NOTE ADULT - SUBJECTIVE AND OBJECTIVE BOX
Ortho Note    Pt comfortable without complaints, pain controlled  Denies CP, SOB, N/V, numbness/tingling     Vital Signs Last 24 Hrs  T(C): 36.8 (12 Jun 2021 05:50), Max: 37.8 (11 Jun 2021 20:29)  T(F): 98.2 (12 Jun 2021 05:50), Max: 100.1 (11 Jun 2021 20:29)  HR: 97 (12 Jun 2021 05:50) (90 - 125)  BP: 142/82 (12 Jun 2021 05:50) (118/73 - 162/80)  BP(mean): --  RR: 16 (12 Jun 2021 05:50) (16 - 18)  SpO2: 97% (12 Jun 2021 05:50) (95% - 98%)    General: Pt Alert and oriented, NAD  DSG C/D/I  Pulses: +2DP, WWP feet  Sensation: SILT BLE  Motor: 5/5 EHL/FHL/TA/GS                          11.8   11.52 )-----------( 127      ( 11 Jun 2021 04:49 )             34.8     06-11    133<L>  |  101  |  15  ----------------------------<  109<H>  4.3   |  21<L>  |  0.99    Ca    9.6      11 Jun 2021 04:49    TPro  6.9  /  Alb  3.1<L>  /  TBili  2.0<H>  /  DBili  x   /  AST  31  /  ALT  9<L>  /  AlkPhos  57  06-11      A/P: 75yMale POD#3 s/p left total hip replacement (posterior)  - hep C reactive- plan for outpatient f/u  - Pain Control  - DVT ppx: ASA  - PT, WBS: WBAT, posterior precautions  - OOB for meals, I/S  - dispo: OMI, pending auth    Ortho Pager [phone]

## 2021-06-12 NOTE — PROGRESS NOTE ADULT - REASON FOR ADMISSION
left hip pain

## 2021-06-12 NOTE — PROGRESS NOTE ADULT - PROBLEM SELECTOR PLAN 3
Viral panel was positive Maurizio C.  Bilirubin is slightly elevated.  Patient is to follow-up with his primary care physician post discharge.  With the patient about the hepatitis C and need to be treated by hepatologist and associated risk with liver cancer.  The follow-up patient is aware and will follow up with his primary physician as an outpatient
Viral panel was positive Maurizio C.  Bilirubin is slightly elevated.  Patient is to follow-up with his primary care physician post discharge.  With the patient about the hepatitis C and need to be treated by hepatologist and associated risk with liver cancer.  The follow-up patient is aware and will follow up with his primary physician as an outpatient
Viral panel was positive Maurizio C.  Bilirubin is slightly elevated.  Patient is to follow-up with his primary care physician post discharge.  Will inform the patient tomorrow

## 2021-06-12 NOTE — DISCHARGE NOTE NURSING/CASE MANAGEMENT/SOCIAL WORK - PATIENT PORTAL LINK FT
You can access the FollowMyHealth Patient Portal offered by [facility] by registering at the following website: [email or web address]. By joining [employer]’s FollowMyHealth portal, you will also be able to view your health information using other applications (apps) compatible with our system.

## 2021-06-12 NOTE — PROGRESS NOTE ADULT - PROVIDER SPECIALTY LIST ADULT
Orthopedics
Internal Medicine
Orthopedics
Internal Medicine
Internal Medicine

## 2021-06-20 DIAGNOSIS — M16.12 UNILATERAL PRIMARY OSTEOARTHRITIS, LEFT HIP: ICD-10-CM

## 2021-06-20 DIAGNOSIS — I10 ESSENTIAL (PRIMARY) HYPERTENSION: ICD-10-CM

## 2021-06-20 DIAGNOSIS — E78.5 HYPERLIPIDEMIA, UNSPECIFIED: ICD-10-CM

## 2021-06-20 DIAGNOSIS — Z87.891 PERSONAL HISTORY OF NICOTINE DEPENDENCE: ICD-10-CM

## 2021-06-20 DIAGNOSIS — B19.20 UNSPECIFIED VIRAL HEPATITIS C WITHOUT HEPATIC COMA: ICD-10-CM

## 2021-10-06 PROBLEM — I10 ESSENTIAL HYPERTENSION: Status: ACTIVE | Noted: 2021-05-14

## 2022-03-07 NOTE — DISCHARGE NOTE PROVIDER - NSDCACTIVITY_GEN_ALL_CORE
Received VM from patient regarding revlimid order. Patient states that RX has not been delivered and she is supposed to resume taking on Wednesday 03/09/22. Called [facility] at [phone] and spoke with Ky Dunn. He states that no current order on file. Order was sent on 03/02/2022. Auth # [id number]. Reordered today 03/07/2022. Receipt confirmed by pharmacy. Once processed, medication will be shipped the next day. Attempted to contact patient at [phone] to notify; however, no answer at this time. VM left. This RN direct contact information provided. Do not drive or operate machinery/Showering allowed/Walking - Indoors allowed/No heavy lifting/straining

## 2022-08-19 NOTE — OCCUPATIONAL THERAPY INITIAL EVALUATION ADULT - ORIENTATION, REHAB EVAL
oriented to person, place, time and situation Tranexamic Acid Counseling:  Patient advised of the small risk of bleeding problems with tranexamic acid. They were also instructed to call if they developed any nausea, vomiting or diarrhea. All of the patient's questions and concerns were addressed.